# Patient Record
Sex: MALE | Race: BLACK OR AFRICAN AMERICAN | URBAN - METROPOLITAN AREA
[De-identification: names, ages, dates, MRNs, and addresses within clinical notes are randomized per-mention and may not be internally consistent; named-entity substitution may affect disease eponyms.]

---

## 2019-06-11 ENCOUNTER — MEDICAL CORRESPONDENCE (OUTPATIENT)
Dept: HEALTH INFORMATION MANAGEMENT | Facility: CLINIC | Age: 29
End: 2019-06-11

## 2019-08-20 ENCOUNTER — PRE VISIT (OUTPATIENT)
Dept: PLASTIC SURGERY | Facility: CLINIC | Age: 29
End: 2019-08-20

## 2019-08-20 NOTE — TELEPHONE ENCOUNTER
FUTURE VISIT INFORMATION      FUTURE VISIT INFORMATION:    Date: 9/24/19    Time: 4:00pm    Location: [facility] [place]  REFERRAL INFORMATION:    Referring provider:  Self    Referring providers clinic:  N/A    Reason for visit/diagnosis  Chest Masculinization Surgery    RECORDS REQUESTED FROM:       No recs to collect

## 2019-09-24 ENCOUNTER — PATIENT OUTREACH (OUTPATIENT)
Dept: PLASTIC SURGERY | Facility: CLINIC | Age: 29
End: 2019-09-24

## 2019-09-24 ENCOUNTER — OFFICE VISIT (OUTPATIENT)
Dept: PLASTIC SURGERY | Facility: CLINIC | Age: 29
End: 2019-09-24
Payer: COMMERCIAL

## 2019-09-24 VITALS
HEART RATE: 71 BPM | HEIGHT: 67 IN | SYSTOLIC BLOOD PRESSURE: 134 MMHG | DIASTOLIC BLOOD PRESSURE: 78 MMHG | WEIGHT: 165.6 LBS | OXYGEN SATURATION: 100 % | BODY MASS INDEX: 25.99 KG/M2

## 2019-09-24 DIAGNOSIS — Z12.31 VISIT FOR SCREENING MAMMOGRAM: ICD-10-CM

## 2019-09-24 DIAGNOSIS — F64.0 GENDER DYSPHORIA IN ADULT: Primary | ICD-10-CM

## 2019-09-24 RX ORDER — NEEDLES, DISPOSABLE 25GX5/8"
NEEDLE, DISPOSABLE MISCELLANEOUS
Refills: 3 | COMMUNITY
Start: 2019-09-05

## 2019-09-24 RX ORDER — SYRINGE, DISPOSABLE, 1 ML
SYRINGE, EMPTY DISPOSABLE MISCELLANEOUS
Refills: 2 | COMMUNITY
Start: 2019-03-13

## 2019-09-24 RX ORDER — SYRINGE AND NEEDLE,INSULIN,1ML 25GX1"
SYRINGE, EMPTY DISPOSABLE MISCELLANEOUS
Refills: 3 | COMMUNITY
Start: 2019-09-06

## 2019-09-24 RX ORDER — TESTOSTERONE CYPIONATE 200 MG/ML
INJECTION, SOLUTION INTRAMUSCULAR
Refills: 0 | COMMUNITY
Start: 2019-09-06

## 2019-09-24 ASSESSMENT — MIFFLIN-ST. JEOR: SCORE: 1679.79

## 2019-09-24 ASSESSMENT — PAIN SCALES - GENERAL: PAINLEVEL: NO PAIN (0)

## 2019-09-24 NOTE — Clinical Note
"2019       RE: Tess Kraus  [address] [facility] [address]     Dear Colleague,    Thank you for referring your patient, eTss Kraus, to the [facility] at [facility]. Please see a copy of my visit note below.    CC: Gender dysphoria, requesting top surgery.    HPI: Patient is a 29-year-old trans-man who prefers he him pronouns.  He has been interested in undergoing top surgery for the past 5 years.  He has history of binding his chest for 1 month after transitioning, but stopped this due to discomfort.  By undergoing top surgery, he would like to better align his physical body with his chosen gender identity.  He transitioned 1 year ago.  Chosen name is Bryan.  He has been on testosterone injection hormone therapy for the past year.  Denies any previous breast history.    MEDS:   Prior to Admission medications    Medication Sig Start Date End Date Taking? Authorizing Provider   B-D INSULIN SYRINGE 25G X 1\" 1 ML MISC USE TO DRAW UP MEDICATION 19  Yes Reported, Patient   B-D LUER-JUSTIN SYRINGE 20G X 1\" 1 ML MISC U TO DRAW UP MEDICATION UTD 3/13/19  Yes Reported, Patient   BD DISP NEEDLES 25G X 5/8\" MISC U UTD 19  Yes Reported, Patient   testosterone cypionate (DEPOTESTOSTERONE) 200 MG/ML injection INJECT 0.4 ML SC Q  WEEK 19  Yes Reported, Patient       ALLERGIES: None.    PMH: None.    PSH: None.    SH:   Social History     Tobacco Use     Smoking status: Former Smoker     Packs/day: 0.00     Last attempt to quit: 2012     Years since quittin.7     Smokeless tobacco: Never Used   Substance Use Topics     Alcohol use: Not on file   Works at [employer].    FH: None.    ROS: Denies chest pain, shortness of breath, diabetes, MI, CVA, DVT, PE, and bleeding disorders.    PHYSICAL EXAMINATION: /78 (BP Location: Left arm, Patient Position: Chair, Cuff Size: Adult Regular)   Pulse 71   Ht 1.702 m (5' 7\")   Wt 75.1 kg " "(165 lb 9.6 oz)   SpO2 100%   BMI 25.94 kg/m     ***    ASSESSMENT: ***    PLAN: ***    Total time spent with patient was *** min of which greater than 50% was in counseling.    CC: Gender dysphoria, requesting top surgery.    HPI: Patient is a 29-year-old trans-man who prefers he him pronouns.  He has been interested in undergoing top surgery for the past 5 years.  He has history of binding his chest for 1 month after transitioning, but stopped this due to discomfort.  By undergoing top surgery, he would like to better align his physical body with his chosen gender identity.  He transitioned 1 year ago.  Chosen name is Bryan.  He has been on testosterone injection hormone therapy for the past year.  Denies any previous breast history.    MEDS:   Prior to Admission medications    Medication Sig Start Date End Date Taking? Authorizing Provider   B-D INSULIN SYRINGE 25G X 1\" 1 ML MISC USE TO DRAW UP MEDICATION 19  Yes Reported, Patient   B-D LUER-JUSTIN SYRINGE 20G X 1\" 1 ML MISC U TO DRAW UP MEDICATION UTD 3/13/19  Yes Reported, Patient   BD DISP NEEDLES 25G X 5/8\" MISC U UTD 19  Yes Reported, Patient   testosterone cypionate (DEPOTESTOSTERONE) 200 MG/ML injection INJECT 0.4 ML SC Q  WEEK 19  Yes Reported, Patient       ALLERGIES: None.    PMH: None.    PSH: None.    SH:   Social History     Tobacco Use     Smoking status: Former Smoker     Packs/day: 0.00     Last attempt to quit: 2012     Years since quittin.7     Smokeless tobacco: Never Used   Substance Use Topics     Alcohol use: Not on file   Works at [employer].    FH: None.    ROS: Denies chest pain, shortness of breath, diabetes, MI, CVA, DVT, PE, and bleeding disorders.    PHYSICAL EXAMINATION: /78 (BP Location: Left arm, Patient Position: Chair, Cuff Size: Adult Regular)   Pulse 71   Ht 1.702 m (5' 7\")   Wt 75.1 kg (165 lb 9.6 oz)   SpO2 100%   BMI 25.94 kg/m     General: In no acute distress.  Appears masculine.  Chest examination " was performed in the presence of a chaperone.  This revealed bilateral grade 2 ptosis.  Pectoralis muscles intact bilaterally.  Patient is muscular.  Notch to nipple distance is 22 cm bilaterally.  Areole or diameter is 4.5 cm on the right and 5 cm on the left.  Nipple to fold distance is 9 cm bilaterally.  Base diameter is 13 7 m bilaterally.    ASSESSMENT: Gender dysphoria, requesting top surgery.    PLAN: Patient has provided us with a letter of support.  I am requesting a baseline screening mammogram and the level of ptosis.  With these obtained, patient is a potential candidate for bilateral simple complete mastectomy with free nipple graft reconstruction as a form of top surgery to masculinize the chest.  We did briefly discuss the possibility of reduction mammoplasty with an inferior pedicle to keep nipple sensation, but the patient would rather have a flat chest and attempt at nipple sensation.  I explained this outpatient procedure in detail today.  I explained the risks to include bleeding, infection, injury to surrounding structures, fluid collection, nipple or nipple graft loss, nipple sensory loss, change in nipple size, wound healing difficulties, contour deformity, dog ears, asymmetry, and need for revision surgery.  Patient accepts these associated risks and wished to proceed with surgery.  We will wait for the mammogram.  Given that the patient is working at [employer] and has lifting duties, I recommend the patient stay away from work for 6 weeks postoperatively.    Total time spent with patient was 30 min of which greater than 50% was in counseling.    Again, thank you for allowing me to participate in the care of your patient.      Sincerely,    Cal Callaway MD

## 2019-09-24 NOTE — NURSING NOTE
"Chief Complaint   Patient presents with     Consult     new pt here for top surg cosnult       Vitals:    09/24/19 1635   BP: 134/78   BP Location: Left arm   Patient Position: Chair   Cuff Size: Adult Regular   Pulse: 71   SpO2: 100%   Weight: 75.1 kg (165 lb 9.6 oz)   Height: 1.702 m (5' 7\")       Body mass index is 25.94 kg/m .    Lang Johnson, EMT    "

## 2019-09-24 NOTE — PROGRESS NOTES
"CC: Gender dysphoria, requesting top surgery.    HPI: Patient is a 29-year-old trans-man who prefers he him pronouns.  He has been interested in undergoing top surgery for the past 5 years.  He has history of binding his chest for 1 month after transitioning, but stopped this due to discomfort.  By undergoing top surgery, he would like to better align his physical body with his chosen gender identity.  He transitioned 1 year ago.  Chosen name is Bryan.  He has been on testosterone injection hormone therapy for the past year.  Denies any previous breast history.    MEDS:   Prior to Admission medications    Medication Sig Start Date End Date Taking? Authorizing Provider   B-D INSULIN SYRINGE 25G X 1\" 1 ML MISC USE TO DRAW UP MEDICATION 19  Yes Reported, Patient   B-D LUER-JUSTIN SYRINGE 20G X 1\" 1 ML MISC U TO DRAW UP MEDICATION UTD 3/13/19  Yes Reported, Patient   BD DISP NEEDLES 25G X 5/8\" MISC U UTD 19  Yes Reported, Patient   testosterone cypionate (DEPOTESTOSTERONE) 200 MG/ML injection INJECT 0.4 ML SC Q  WEEK 19  Yes Reported, Patient       ALLERGIES: None.    PMH: None.    PSH: None.    SH:   Social History     Tobacco Use     Smoking status: Former Smoker     Packs/day: 0.00     Last attempt to quit: 2012     Years since quittin.7     Smokeless tobacco: Never Used   Substance Use Topics     Alcohol use: Not on file   Works at [employer].    FH: None.    ROS: Denies chest pain, shortness of breath, diabetes, MI, CVA, DVT, PE, and bleeding disorders.    PHYSICAL EXAMINATION: /78 (BP Location: Left arm, Patient Position: Chair, Cuff Size: Adult Regular)   Pulse 71   Ht 1.702 m (5' 7\")   Wt 75.1 kg (165 lb 9.6 oz)   SpO2 100%   BMI 25.94 kg/m    General: In no acute distress.  Appears masculine.  Chest examination was performed in the presence of a chaperone.  This revealed bilateral grade 2 ptosis.  Pectoralis muscles intact bilaterally.  Patient is muscular.  Notch to nipple distance is 22 " cm bilaterally.  Areole or diameter is 4.5 cm on the right and 5 cm on the left.  Nipple to fold distance is 9 cm bilaterally.  Base diameter is 13 7 m bilaterally.    ASSESSMENT: Gender dysphoria, requesting top surgery.    PLAN: Patient has provided us with a letter of support.  I am requesting a baseline screening mammogram and the level of ptosis.  With these obtained, patient is a potential candidate for bilateral simple complete mastectomy with free nipple graft reconstruction as a form of top surgery to masculinize the chest.  We did briefly discuss the possibility of reduction mammoplasty with an inferior pedicle to keep nipple sensation, but the patient would rather have a flat chest and attempt at nipple sensation.  I explained this outpatient procedure in detail today.  I explained the risks to include bleeding, infection, injury to surrounding structures, fluid collection, nipple or nipple graft loss, nipple sensory loss, change in nipple size, wound healing difficulties, contour deformity, dog ears, asymmetry, and need for revision surgery.  Patient accepts these associated risks and wished to proceed with surgery.  We will wait for the mammogram.  Given that the patient is working at [employer] and has lifting duties, I recommend the patient stay away from work for 6 weeks postoperatively.    Total time spent with patient was 30 min of which greater than 50% was in counseling.

## 2019-09-24 NOTE — LETTER
Date:October 2, 2019      Patient was self referred, no letter generated. Do not send.        [facility] Health Information

## 2019-09-24 NOTE — PROGRESS NOTES
[facility]:  Care Coordination Note     SITUATION   Patient (Bryan, He/Him) is a 29 year old who is receiving support for:  Consult For (Top Surgery - Dr. Callaway) and Clinic Care Coordination - Face To Face  .    BACKGROUND     Pt attended consultation for top surgery with Dr. Callaway, pt was unaccompanied. Pt provided letter of support which was adequate for PA. Pt will likely use FMLA benefits for surgery. Pts girlfriend will support pt through surgery process and aftercare.     Pt recently moved from [place], [place] to [place], [place]. Pt lives with girlfriend, works at [employer] in [place], [place].     ASSESSMENT     Surgery              CGC Assessment  Comprehensive Gender Care ([facility]) Enrollment: Enrolled  Patient has a therapist: Yes  Name of therapist: Sharron Maravilla at [facility] in [place]  Letter of support #1: Received  Letter #1 Date: 06/11/19  Surgery being considered: Yes  Mastectomy: Yes          PLAN          Nursing Interventions:   [facility] program and services discussed with patient. Educational surgical packet provided and reviewed with patient. Process for accessing surgery discussed, including: WPATH standards of care, letters of support, treatment plan action steps, PA insurance process, surgery scheduling, and approximate timeline.     Pt has letter of support in chart, which is adequate for PA. KARLOS and photography consent signed by patient. Surgeon s photography outcomes reviewed with patient. Pt questions answered within scope of practice.     Pt was given list of PCP referrals.      Follow-up plan:  Pt needs to obtain mammogram per Dr. Callaway's note on 9/24/19. Once completed ready to PA.        Geo Heller

## 2019-09-27 ENCOUNTER — ANCILLARY PROCEDURE (OUTPATIENT)
Dept: MAMMOGRAPHY | Facility: CLINIC | Age: 29
End: 2019-09-27
Attending: PLASTIC SURGERY
Payer: COMMERCIAL

## 2019-09-27 DIAGNOSIS — Z12.31 VISIT FOR SCREENING MAMMOGRAM: ICD-10-CM

## 2019-10-03 ENCOUNTER — PATIENT OUTREACH (OUTPATIENT)
Dept: PLASTIC SURGERY | Facility: CLINIC | Age: 29
End: 2019-10-03

## 2019-10-03 NOTE — PROGRESS NOTES
[facility]:  Care Coordination Note     SITUATION   Bryan Kraus is a 29 year old adult who is receiving support for:  Gender Dysphoria.    BACKGROUND     Pt attended consultation for top surgery with Dr. Callaway.    ASSESSMENT     Surgery              CGC Assessment  Comprehensive Gender Care (CGC) Enrollment: Enrolled  Patient has a therapist: Yes  Name of therapist: Sharron Maravilla at [facility] in [place]  Letter of support #1: Received  Letter #1 Date: 06/11/19  Surgery being considered: Yes  Mastectomy: Yes  Mammogram completed: Yes(9/27/19 Negative)    9/27/19 12:27 PM [id number] [facility]    Assessment Category     Negative [1]   PACS Images      Show images for MA Screening Digital Bilateral   Study Result     Examination: Bilateral digital screening mammography with computer  aided detection.     Comparison: None.     History/Family history: No symptoms. Preoperative screening. No family  history.     BREAST DENSITY: Heterogeneously dense.     COMMENTS: No suspicious finding.                                                                      IMPRESSION: BI-RADS CATEGORY: 1 -  Negative.     RECOMMENDED FOLLOW-UP: Clinical Follow-up.     Results to be sent to the patient.     I have personally reviewed the examination and initial interpretation  and I agree with the findings.     BRICE ELI MD           PLAN       Nursing Interventions: LOS adequate. Mammogram results negative.     Follow-up plan:  Ready to PA.       Palmira Luis RN

## 2019-10-07 ENCOUNTER — TELEPHONE (OUTPATIENT)
Dept: PLASTIC SURGERY | Facility: CLINIC | Age: 29
End: 2019-10-07

## 2019-10-10 ENCOUNTER — TELEPHONE (OUTPATIENT)
Dept: PLASTIC SURGERY | Facility: CLINIC | Age: 29
End: 2019-10-10

## 2019-10-10 DIAGNOSIS — F64.0 GENDER DYSPHORIA IN ADULT: Primary | ICD-10-CM

## 2019-10-10 RX ORDER — CEFAZOLIN SODIUM 2 G/50ML
2 SOLUTION INTRAVENOUS
Status: CANCELLED | OUTPATIENT
Start: 2019-10-10

## 2019-10-10 RX ORDER — CEFAZOLIN SODIUM 1 G/50ML
1 INJECTION, SOLUTION INTRAVENOUS SEE ADMIN INSTRUCTIONS
Status: CANCELLED | OUTPATIENT
Start: 2019-10-10

## 2019-10-10 NOTE — TELEPHONE ENCOUNTER
received [facility] fax, approving PA for bilateral mastectomy with dr Callaway, ref#[id number] for date span 10/7/19-4/6/20

## 2019-10-21 ENCOUNTER — TELEPHONE (OUTPATIENT)
Dept: PLASTIC SURGERY | Facility: CLINIC | Age: 29
End: 2019-10-21

## 2019-10-21 PROBLEM — F64.0 GENDER DYSPHORIA IN ADULT: Status: ACTIVE | Noted: 2019-10-21

## 2019-10-21 NOTE — TELEPHONE ENCOUNTER
Patient is scheduled for surgery with Dr. Avi Callaway      Spoke with: Bryan    Date of Surgery: 10/24/2019    Location: [address] [address]  3rd floor- 3C    Informed patient they will need an adult  YES    Pre-op with surgeon (if applicable): not required    H&P: Scheduled with NP clinic at [facility] on 10/22/2019    Additional imaging/appointments: Not required    Surgery packet: MyChart     Additional comments: Post op scheduled on 11/01/2019 with nurse.

## 2019-10-22 ENCOUNTER — OFFICE VISIT (OUTPATIENT)
Dept: FAMILY MEDICINE | Facility: CLINIC | Age: 29
End: 2019-10-22
Payer: COMMERCIAL

## 2019-10-22 VITALS
SYSTOLIC BLOOD PRESSURE: 146 MMHG | HEIGHT: 67 IN | BODY MASS INDEX: 26.57 KG/M2 | HEART RATE: 57 BPM | OXYGEN SATURATION: 100 % | DIASTOLIC BLOOD PRESSURE: 79 MMHG | WEIGHT: 169.3 LBS

## 2019-10-22 DIAGNOSIS — Z01.818 PRE-OPERATIVE GENERAL PHYSICAL EXAMINATION: Primary | ICD-10-CM

## 2019-10-22 ASSESSMENT — PAIN SCALES - GENERAL: PAINLEVEL: NO PAIN (0)

## 2019-10-22 ASSESSMENT — MIFFLIN-ST. JEOR: SCORE: 1696.57

## 2019-10-22 NOTE — NURSING NOTE
"29 year old  Chief Complaint   Patient presents with     Pre-Op Exam       Blood pressure (!) 146/79, pulse 57, height 1.702 m (5' 7\"), weight 76.8 kg (169 lb 4.8 oz), SpO2 100 %. Body mass index is 26.52 kg/m .  BP completed using cuff size:    Patient Active Problem List   Diagnosis     Gender dysphoria in adult       Wt Readings from Last 2 Encounters:   10/22/19 76.8 kg (169 lb 4.8 oz)   19 75.1 kg (165 lb 9.6 oz)     BP Readings from Last 3 Encounters:   10/22/19 (!) 146/79   19 134/78       No Known Allergies    Current Outpatient Medications   Medication     B-D INSULIN SYRINGE 25G X 1\" 1 ML MISC     B-D LUER-JUSTIN SYRINGE 20G X 1\" 1 ML MISC     BD DISP NEEDLES 25G X 5/8\" MISC     testosterone cypionate (DEPOTESTOSTERONE) 200 MG/ML injection     No current facility-administered medications for this visit.        Social History     Tobacco Use     Smoking status: Former Smoker     Packs/day: 0.00     Last attempt to quit:      Years since quittin.8     Smokeless tobacco: Never Used   Substance Use Topics     Alcohol use: None     Drug use: None       Honoring Choices - Health Care Directive Guide offered to patient at time of visit.    Health Maintenance Due   Topic Date Due     PREVENTIVE CARE VISIT  1991     HIV SCREENING  2006     PAP  2012     DTAP/TDAP/TD IMMUNIZATION (1 - Tdap) 2016     INFLUENZA VACCINE (1) 2019         There is no immunization history on file for this patient.    No results found for: PAP      No lab results found.    PHQ-2 (  [employer]) 10/22/2019   Q1: Little interest or pleasure in doing things 0   Q2: Feeling down, depressed or hopeless 0   PHQ-2 Score 0       No flowsheet data found.    No flowsheet data found.    No flowsheet data found.      Karol Ochoa, [facility]  2019 3:52 PM  "

## 2019-10-22 NOTE — PROGRESS NOTES
"[facility]  [address]  [address]TH FLOOR  [facility] [address]  [phone]  Dept: [phone]    PRE-OP EVALUATION:  Today's date: 10/22/2019    Tess Kraus (: 1990) presents for pre-operative evaluation assessment as requested by ***.  He requires evaluation and anesthesia risk assessment prior to undergoing surgery/procedure for treatment of *** .  Proposed procedure: ***    Date of Surgery/ Procedure: ***  Time of Surgery/ Procedure: ***  Hospital/Surgical Facility: ***  {SURGERY FAX NUMBER:[id number]::\"Fax number for surgical facility: ***\"}  Primary Physician: No primary care provider on file.  Type of Anesthesia Anticipated: {ANESTHESIA:763029}    Patient has a Health Care Directive or Living Will:  {YES/NO:170207::\"NO\"}    {PREOP QUESTIONNAIRE OPTIONS 2 (by MA):971450}    HPI:                                                      Brief HPI related to upcoming procedure: ***      {Ch. Problems:745044}    MEDICAL HISTORY:                                                      Patient Active Problem List    Diagnosis Date Noted     Gender dysphoria in adult 10/21/2019     Priority: Medium     Added automatically from request for surgery [id number]        No past medical history on file.  No past surgical history on file.  Current Outpatient Medications   Medication Sig Dispense Refill     B-D INSULIN SYRINGE 25G X 1\" 1 ML MISC USE TO DRAW UP MEDICATION  3     B-D LUER-JUSTIN SYRINGE 20G X 1\" 1 ML MISC U TO DRAW UP MEDICATION UTD  2     BD DISP NEEDLES 25G X 5/8\" MISC U UTD  3     testosterone cypionate (DEPOTESTOSTERONE) 200 MG/ML injection INJECT 0.4 ML SC Q  WEEK  0     OTC products: {OTC ANALGESICS:520093}    No Known Allergies   Latex Allergy: {YES/NO WITH DEFAULT:854416::\"NO\"}    Social History     Tobacco Use     Smoking status: Former Smoker     Packs/day: 0.00     Last attempt to quit:      Years since quittin.8     Smokeless tobacco: Never Used   Substance Use " "Topics     Alcohol use: Not on file     History   Drug Use Not on file       REVIEW OF SYSTEMS:                                                    {ROS Preop Choices:384588}    EXAM:                                                    There were no vitals taken for this visit.  {EXAM Preop Choices:051822}    DIAGNOSTICS:                                                    {DIAGNOSTIC FOR PREOP:778827}    No results for input(s): HGB, PLT, INR, NA, POTASSIUM, CR, A1C in the last 33259 hours.     IMPRESSION:                                                    {PREOP REASONS:710009::\"Reason for surgery/procedure: ***\",\"Diagnosis/reason for consult: ***\"}    The proposed surgical procedure is considered {HIGH=major cardiovascular or procedures requiring prolonged anesthesia >4 hours or large fluid shifts;    INTERMEDIATE=abdominal, most orthopedic and intrathoracic surgery; LOW= endoscopy, cataract and breast surgery:794329} risk.    REVISED CARDIAC RISK INDEX  The patient has the following serious cardiovascular risks for perioperative complications such as (MI, PE, VFib and 3  AV Block):  {PREOP REVISED CARDIAC INDEX (RCI):214606:p:\"No serious cardiac risks\"}  INTERPRETATION: {REVISED CARDIAC RISK INTERPRETATION:247283}    The patient has the following additional risks for perioperative complications:  {Additional perioperative risks:552291:p:\"No identified additional risks\"}    {Additional contraindications to elective surgery:384169::\"APPROVAL GIVEN to proceed with proposed procedure, without further diagnostic evaluation\"}    No diagnosis found.    RECOMMENDATIONS:                                                    {IMPORTANT - complete carefully!!:486844}     Signed Electronically by: Estefani Cade NP    Copy of this evaluation report is provided to requesting physician.    [facility] Preop Guidelines    "

## 2019-10-22 NOTE — PROGRESS NOTES
[facility]  [address]  [address] [facility] [address]  [phone]  Dept: [phone]    PRE-OP EVALUATION:  Today's date: 10/22/2019    Tess Kraus (: 1990) presents for pre-operative evaluation assessment as requested by Dr. Avi Callaway.  He requires evaluation and anesthesia risk assessment prior to undergoing surgery/procedure for treatment of desired surgery related to gender dysphoria.  Proposed procedure: Bilateral Mastectomy with free nipple grafting    Date of Surgery/ Procedure: 10/24/2019  Time of Surgery/ Procedure: 3:05 pm  Hospital/Surgical Facility: [facility] [address]  Primary Physician: No primary care provider on file.  Type of Anesthesia Anticipated: Combine general with block    Patient has a Health Care Directive or Living Will: Unknown      Preop Questions 10/22/2019   Who is doing your surgery? Dr. Cal Callaway   What are you having done? Double Incision   Date of Surgery/Procedure: 10/24/19   Facility or Hospital where procedure/surgery will be performed: [facility]   1.  Do you have a history of Heart attack, stroke, stent, coronary bypass surgery, or other heart surgery? No   2.  Do you ever have any pain or discomfort in your chest? No   3.  Do you have a history of  Heart Failure? No   4.   Are you troubled by shortness of breath when:  walking on a level surface, or up a slight hill, or at night? No   5.  Do you currently have a cold, bronchitis or other respiratory infection? No   6.  Do you have a cough, shortness of breath, or wheezing? No   7.  Do you sometimes get pains in the calves of your legs when you walk? No   8. Do you or anyone in your family have previous history of blood clots? No   9.  Do you or does anyone in your family have a serious bleeding problem such as prolonged bleeding following surgeries or cuts? No   10. Have you ever had problems with anemia or been told to take iron pills? No   11.  "Have you had any abnormal blood loss such as black, tarry or bloody stools? No   12. Have you ever had a blood transfusion? No   13. Have you or any of your relatives ever had problems with anesthesia? No   14. Do you have sleep apnea, excessive snoring or daytime drowsiness? No   15. Do you have any prosthetic heart valves? No   16. Do you have prosthetic joints? No         HPI:                                                      Brief HPI related to upcoming procedure: Pt will be undergoing a top surgery for mastectomy on 10/24.  He has been using testosterone and transitioning with the support of his girlfriend.        MEDICAL HISTORY:                                                      Patient Active Problem List    Diagnosis Date Noted     Gender dysphoria in adult 10/21/2019     Priority: Medium     Added automatically from request for surgery [id number]        No past medical history on file.  No past surgical history on file.  Current Outpatient Medications   Medication Sig Dispense Refill     B-D INSULIN SYRINGE 25G X 1\" 1 ML MISC USE TO DRAW UP MEDICATION  3     B-D LUER-JUSTIN SYRINGE 20G X 1\" 1 ML MISC U TO DRAW UP MEDICATION UTD  2     BD DISP NEEDLES 25G X 5/8\" MISC U UTD  3     testosterone cypionate (DEPOTESTOSTERONE) 200 MG/ML injection INJECT 0.4 ML SC Q  WEEK  0     OTC products: Ibuprofen taken two days ago.    No Known Allergies   Latex Allergy: NO    Social History     Tobacco Use     Smoking status: Former Smoker     Packs/day: 0.00     Last attempt to quit: 2012     Years since quittin.8     Smokeless tobacco: Never Used   Substance Use Topics     Alcohol use: Not on file     History   Drug Use Not on file       REVIEW OF SYSTEMS:                                                    Constitutional, neuro, ENT, endocrine, pulmonary, cardiac, gastrointestinal, genitourinary, musculoskeletal, integument and psychiatric systems are negative, except as otherwise noted.    EXAM:                       " "                             BP (!) 146/79   Pulse 57   Ht 1.702 m (5' 7\")   Wt 76.8 kg (169 lb 4.8 oz)   SpO2 100%   BMI 26.52 kg/m        GENERAL APPEARANCE: healthy, alert and no distress     EYES: EOMI,  PERRL     HENT: ear canals and TM's normal and nose and mouth without ulcers or lesions     NECK: Right submaxillary enlargement. No adenopathy, no asymmetry, masses, or scars and thyroid normal to palpation     RESP: lungs clear to auscultation - no rales, rhonchi or wheezes     CV: regular rates and rhythm, normal S1 S2, no S3 or S4 and no murmur, click or rub     ABDOMEN:  soft, nontender, no HSM or masses and bowel sounds normal     MS: extremities normal- no gross deformities noted, no evidence of inflammation in joints, FROM in all extremities.     SKIN: no suspicious lesions or rashes     NEURO: Normal strength and tone, sensory exam grossly normal, mentation intact and speech normal     PSYCH: mentation appears normal. and affect normal/bright           DIAGNOSTICS:                                                    No labs or EKG required for low risk surgery (cataract, skin procedure, breast biopsy, etc)    No results for input(s): HGB, PLT, INR, NA, POTASSIUM, CR, A1C in the last 42820 hours.     IMPRESSION:                                                    Reason for surgery/procedure: Elective  Diagnosis/reason for consult: Pre-op evaluation prior to surgery.    The proposed surgical procedure is considered LOW risk.    REVISED CARDIAC RISK INDEX  The patient has the following serious cardiovascular risks for perioperative complications such as (MI, PE, VFib and 3  AV Block):  No serious cardiac risks  INTERPRETATION: 1 risks: Class II (low risk - 0.9% complication rate)    The patient has the following additional risks for perioperative complications:  No identified additional risks    APPROVAL GIVEN to proceed with proposed procedure, without further diagnostic evaluation    No diagnosis " found.    RECOMMENDATIONS:                                                    --Consult hospital rounder / IM to assist post-op medical management     Kingsley Modi DNP-FNP Student, [facility]    Signed Electronically by: Estefani Cade NP    I, Estefani Cade DNP, APRN, FNP, ANP, reviewed and verified the nurse practitioner (NP)  student's documentation of the patient's history.  I performed the exam and medical decision making activities with the NP student, who we present for learning purposes.      Copy of this evaluation report is provided to requesting physician.    [facility] Preop Guidelines

## 2019-10-23 ENCOUNTER — TRANSFERRED RECORDS (OUTPATIENT)
Dept: HEALTH INFORMATION MANAGEMENT | Facility: CLINIC | Age: 29
End: 2019-10-23

## 2019-10-23 ENCOUNTER — ANESTHESIA EVENT (OUTPATIENT)
Dept: SURGERY | Facility: CLINIC | Age: 29
End: 2019-10-23
Payer: COMMERCIAL

## 2019-10-24 ENCOUNTER — ANESTHESIA (OUTPATIENT)
Dept: SURGERY | Facility: CLINIC | Age: 29
End: 2019-10-24
Payer: COMMERCIAL

## 2019-10-24 ENCOUNTER — HOSPITAL ENCOUNTER (OUTPATIENT)
Facility: CLINIC | Age: 29
Discharge: HOME | End: 2019-10-24
Attending: PLASTIC SURGERY | Admitting: PLASTIC SURGERY
Payer: COMMERCIAL

## 2019-10-24 VITALS
BODY MASS INDEX: 26.06 KG/M2 | HEIGHT: 67 IN | RESPIRATION RATE: 16 BRPM | WEIGHT: 166.01 LBS | DIASTOLIC BLOOD PRESSURE: 60 MMHG | TEMPERATURE: 97.9 F | OXYGEN SATURATION: 98 % | HEART RATE: 73 BPM | SYSTOLIC BLOOD PRESSURE: 94 MMHG

## 2019-10-24 DIAGNOSIS — F64.0 GENDER DYSPHORIA IN ADULT: ICD-10-CM

## 2019-10-24 LAB
GLUCOSE BLDC GLUCOMTR-MCNC: 74 MG/DL (ref 70–99)
HCG UR QL: NEGATIVE
HGB BLD-MCNC: 16 G/DL (ref 13.3–17.7)

## 2019-10-24 PROCEDURE — 82962 GLUCOSE BLOOD TEST: CPT

## 2019-10-24 PROCEDURE — 25000128 H RX IP 250 OP 636: Performed by: STUDENT IN AN ORGANIZED HEALTH CARE EDUCATION/TRAINING PROGRAM

## 2019-10-24 PROCEDURE — 25800030 ZZH RX IP 258 OP 636: Performed by: STUDENT IN AN ORGANIZED HEALTH CARE EDUCATION/TRAINING PROGRAM

## 2019-10-24 PROCEDURE — 40000171 ZZH STATISTIC PRE-PROCEDURE ASSESSMENT III: Performed by: PLASTIC SURGERY

## 2019-10-24 PROCEDURE — 71000027 ZZH RECOVERY PHASE 2 EACH 15 MINS: Performed by: PLASTIC SURGERY

## 2019-10-24 PROCEDURE — 25000128 H RX IP 250 OP 636: Performed by: NURSE ANESTHETIST, CERTIFIED REGISTERED

## 2019-10-24 PROCEDURE — 25000128 H RX IP 250 OP 636: Performed by: ANESTHESIOLOGY

## 2019-10-24 PROCEDURE — 36415 COLL VENOUS BLD VENIPUNCTURE: CPT | Performed by: ANESTHESIOLOGY

## 2019-10-24 PROCEDURE — 88305 TISSUE EXAM BY PATHOLOGIST: CPT | Performed by: PLASTIC SURGERY

## 2019-10-24 PROCEDURE — 85018 HEMOGLOBIN: CPT | Performed by: ANESTHESIOLOGY

## 2019-10-24 PROCEDURE — 25800030 ZZH RX IP 258 OP 636: Performed by: PLASTIC SURGERY

## 2019-10-24 PROCEDURE — 27210794 ZZH OR GENERAL SUPPLY STERILE: Performed by: PLASTIC SURGERY

## 2019-10-24 PROCEDURE — 25000125 ZZHC RX 250: Performed by: STUDENT IN AN ORGANIZED HEALTH CARE EDUCATION/TRAINING PROGRAM

## 2019-10-24 PROCEDURE — 71000014 ZZH RECOVERY PHASE 1 LEVEL 2 FIRST HR: Performed by: PLASTIC SURGERY

## 2019-10-24 PROCEDURE — 25000132 ZZH RX MED GY IP 250 OP 250 PS 637: Performed by: STUDENT IN AN ORGANIZED HEALTH CARE EDUCATION/TRAINING PROGRAM

## 2019-10-24 PROCEDURE — 25000125 ZZHC RX 250: Performed by: NURSE ANESTHETIST, CERTIFIED REGISTERED

## 2019-10-24 PROCEDURE — 25000566 ZZH SEVOFLURANE, EA 15 MIN: Performed by: PLASTIC SURGERY

## 2019-10-24 PROCEDURE — 25800030 ZZH RX IP 258 OP 636: Performed by: NURSE ANESTHETIST, CERTIFIED REGISTERED

## 2019-10-24 PROCEDURE — 25000128 H RX IP 250 OP 636: Performed by: PLASTIC SURGERY

## 2019-10-24 PROCEDURE — 37000008 ZZH ANESTHESIA TECHNICAL FEE, 1ST 30 MIN: Performed by: PLASTIC SURGERY

## 2019-10-24 PROCEDURE — 36000057 ZZH SURGERY LEVEL 3 1ST 30 MIN - UMMC: Performed by: PLASTIC SURGERY

## 2019-10-24 PROCEDURE — 37000009 ZZH ANESTHESIA TECHNICAL FEE, EACH ADDTL 15 MIN: Performed by: PLASTIC SURGERY

## 2019-10-24 PROCEDURE — 36000059 ZZH SURGERY LEVEL 3 EA 15 ADDTL MIN UMMC: Performed by: PLASTIC SURGERY

## 2019-10-24 PROCEDURE — 81025 URINE PREGNANCY TEST: CPT | Performed by: ANESTHESIOLOGY

## 2019-10-24 RX ORDER — SODIUM CHLORIDE, SODIUM LACTATE, POTASSIUM CHLORIDE, CALCIUM CHLORIDE 600; 310; 30; 20 MG/100ML; MG/100ML; MG/100ML; MG/100ML
INJECTION, SOLUTION INTRAVENOUS CONTINUOUS PRN
Status: DISCONTINUED | OUTPATIENT
Start: 2019-10-24 | End: 2019-10-24

## 2019-10-24 RX ORDER — HYDROMORPHONE HYDROCHLORIDE 1 MG/ML
.3-.5 INJECTION, SOLUTION INTRAMUSCULAR; INTRAVENOUS; SUBCUTANEOUS EVERY 10 MIN PRN
Status: DISCONTINUED | OUTPATIENT
Start: 2019-10-24 | End: 2019-10-24 | Stop reason: HOSPADM

## 2019-10-24 RX ORDER — AMOXICILLIN 250 MG
1-2 CAPSULE ORAL 2 TIMES DAILY PRN
Qty: 30 TABLET | Refills: 0 | Status: SHIPPED | OUTPATIENT
Start: 2019-10-24

## 2019-10-24 RX ORDER — FENTANYL CITRATE 50 UG/ML
25-50 INJECTION, SOLUTION INTRAMUSCULAR; INTRAVENOUS
Status: DISCONTINUED | OUTPATIENT
Start: 2019-10-24 | End: 2019-10-24 | Stop reason: HOSPADM

## 2019-10-24 RX ORDER — ONDANSETRON 4 MG/1
4 TABLET, ORALLY DISINTEGRATING ORAL
Status: DISCONTINUED | OUTPATIENT
Start: 2019-10-24 | End: 2019-10-24 | Stop reason: HOSPADM

## 2019-10-24 RX ORDER — LIDOCAINE HYDROCHLORIDE 20 MG/ML
INJECTION, SOLUTION INFILTRATION; PERINEURAL PRN
Status: DISCONTINUED | OUTPATIENT
Start: 2019-10-24 | End: 2019-10-24

## 2019-10-24 RX ORDER — OXYCODONE HYDROCHLORIDE 5 MG/1
5-10 TABLET ORAL EVERY 6 HOURS PRN
Qty: 26 TABLET | Refills: 0 | Status: SHIPPED | OUTPATIENT
Start: 2019-10-24

## 2019-10-24 RX ORDER — SODIUM CHLORIDE, SODIUM LACTATE, POTASSIUM CHLORIDE, CALCIUM CHLORIDE 600; 310; 30; 20 MG/100ML; MG/100ML; MG/100ML; MG/100ML
INJECTION, SOLUTION INTRAVENOUS CONTINUOUS
Status: DISCONTINUED | OUTPATIENT
Start: 2019-10-24 | End: 2019-10-24 | Stop reason: HOSPADM

## 2019-10-24 RX ORDER — GABAPENTIN 300 MG/1
300 CAPSULE ORAL ONCE
Status: COMPLETED | OUTPATIENT
Start: 2019-10-24 | End: 2019-10-24

## 2019-10-24 RX ORDER — EPHEDRINE SULFATE 50 MG/ML
INJECTION, SOLUTION INTRAMUSCULAR; INTRAVENOUS; SUBCUTANEOUS PRN
Status: DISCONTINUED | OUTPATIENT
Start: 2019-10-24 | End: 2019-10-24

## 2019-10-24 RX ORDER — ONDANSETRON 2 MG/ML
4 INJECTION INTRAMUSCULAR; INTRAVENOUS EVERY 30 MIN PRN
Status: DISCONTINUED | OUTPATIENT
Start: 2019-10-24 | End: 2019-10-24 | Stop reason: HOSPADM

## 2019-10-24 RX ORDER — HYDROXYZINE HYDROCHLORIDE 25 MG/1
25 TABLET, FILM COATED ORAL
Status: DISCONTINUED | OUTPATIENT
Start: 2019-10-24 | End: 2019-10-24 | Stop reason: HOSPADM

## 2019-10-24 RX ORDER — FENTANYL CITRATE 50 UG/ML
INJECTION, SOLUTION INTRAMUSCULAR; INTRAVENOUS PRN
Status: DISCONTINUED | OUTPATIENT
Start: 2019-10-24 | End: 2019-10-24

## 2019-10-24 RX ORDER — DEXAMETHASONE SODIUM PHOSPHATE 10 MG/ML
INJECTION, SOLUTION INTRAMUSCULAR; INTRAVENOUS PRN
Status: DISCONTINUED | OUTPATIENT
Start: 2019-10-24 | End: 2019-10-24

## 2019-10-24 RX ORDER — PROPOFOL 10 MG/ML
INJECTION, EMULSION INTRAVENOUS PRN
Status: DISCONTINUED | OUTPATIENT
Start: 2019-10-24 | End: 2019-10-24

## 2019-10-24 RX ORDER — DEXAMETHASONE SODIUM PHOSPHATE 4 MG/ML
INJECTION, SOLUTION INTRA-ARTICULAR; INTRALESIONAL; INTRAMUSCULAR; INTRAVENOUS; SOFT TISSUE PRN
Status: DISCONTINUED | OUTPATIENT
Start: 2019-10-24 | End: 2019-10-24

## 2019-10-24 RX ORDER — CEFAZOLIN SODIUM 2 G/100ML
INJECTION, SOLUTION INTRAVENOUS PRN
Status: DISCONTINUED | OUTPATIENT
Start: 2019-10-24 | End: 2019-10-24

## 2019-10-24 RX ORDER — NALOXONE HYDROCHLORIDE 0.4 MG/ML
.1-.4 INJECTION, SOLUTION INTRAMUSCULAR; INTRAVENOUS; SUBCUTANEOUS
Status: DISCONTINUED | OUTPATIENT
Start: 2019-10-24 | End: 2019-10-24 | Stop reason: HOSPADM

## 2019-10-24 RX ORDER — BUPIVACAINE HYDROCHLORIDE 2.5 MG/ML
INJECTION, SOLUTION EPIDURAL; INFILTRATION; INTRACAUDAL PRN
Status: DISCONTINUED | OUTPATIENT
Start: 2019-10-24 | End: 2019-10-24

## 2019-10-24 RX ORDER — FENTANYL CITRATE 50 UG/ML
25-50 INJECTION, SOLUTION INTRAMUSCULAR; INTRAVENOUS EVERY 5 MIN PRN
Status: DISCONTINUED | OUTPATIENT
Start: 2019-10-24 | End: 2019-10-24 | Stop reason: HOSPADM

## 2019-10-24 RX ORDER — GLYCOPYRROLATE 0.2 MG/ML
INJECTION, SOLUTION INTRAMUSCULAR; INTRAVENOUS PRN
Status: DISCONTINUED | OUTPATIENT
Start: 2019-10-24 | End: 2019-10-24

## 2019-10-24 RX ORDER — ACETAMINOPHEN 325 MG/1
650 TABLET ORAL
Status: DISCONTINUED | OUTPATIENT
Start: 2019-10-24 | End: 2019-10-24 | Stop reason: HOSPADM

## 2019-10-24 RX ORDER — MEPERIDINE HYDROCHLORIDE 25 MG/ML
12.5 INJECTION INTRAMUSCULAR; INTRAVENOUS; SUBCUTANEOUS
Status: DISCONTINUED | OUTPATIENT
Start: 2019-10-24 | End: 2019-10-24 | Stop reason: HOSPADM

## 2019-10-24 RX ORDER — ACETAMINOPHEN 325 MG/1
975 TABLET ORAL ONCE
Status: COMPLETED | OUTPATIENT
Start: 2019-10-24 | End: 2019-10-24

## 2019-10-24 RX ORDER — HYDROXYZINE PAMOATE 25 MG/1
25 CAPSULE ORAL EVERY 6 HOURS PRN
Qty: 30 CAPSULE | Refills: 0 | Status: SHIPPED | OUTPATIENT
Start: 2019-10-24

## 2019-10-24 RX ORDER — ACETAMINOPHEN 325 MG/1
975 TABLET ORAL ONCE
Status: DISCONTINUED | OUTPATIENT
Start: 2019-10-24 | End: 2019-10-24 | Stop reason: HOSPADM

## 2019-10-24 RX ORDER — ONDANSETRON 4 MG/1
4 TABLET, ORALLY DISINTEGRATING ORAL EVERY 30 MIN PRN
Status: DISCONTINUED | OUTPATIENT
Start: 2019-10-24 | End: 2019-10-24 | Stop reason: HOSPADM

## 2019-10-24 RX ORDER — ONDANSETRON 2 MG/ML
INJECTION INTRAMUSCULAR; INTRAVENOUS PRN
Status: DISCONTINUED | OUTPATIENT
Start: 2019-10-24 | End: 2019-10-24

## 2019-10-24 RX ORDER — FLUMAZENIL 0.1 MG/ML
0.2 INJECTION, SOLUTION INTRAVENOUS
Status: DISCONTINUED | OUTPATIENT
Start: 2019-10-24 | End: 2019-10-24 | Stop reason: HOSPADM

## 2019-10-24 RX ORDER — CEFAZOLIN SODIUM 2 G/100ML
2 INJECTION, SOLUTION INTRAVENOUS
Status: COMPLETED | OUTPATIENT
Start: 2019-10-24 | End: 2019-10-24

## 2019-10-24 RX ORDER — CEFAZOLIN SODIUM 1 G/3ML
1 INJECTION, POWDER, FOR SOLUTION INTRAMUSCULAR; INTRAVENOUS SEE ADMIN INSTRUCTIONS
Status: DISCONTINUED | OUTPATIENT
Start: 2019-10-24 | End: 2019-10-24 | Stop reason: HOSPADM

## 2019-10-24 RX ORDER — OXYCODONE HYDROCHLORIDE 5 MG/1
5 TABLET ORAL
Status: DISCONTINUED | OUTPATIENT
Start: 2019-10-24 | End: 2019-10-24 | Stop reason: HOSPADM

## 2019-10-24 RX ORDER — LIDOCAINE 40 MG/G
CREAM TOPICAL
Status: DISCONTINUED | OUTPATIENT
Start: 2019-10-24 | End: 2019-10-24 | Stop reason: HOSPADM

## 2019-10-24 RX ADMIN — BUPIVACAINE HYDROCHLORIDE 60 ML: 2.5 INJECTION, SOLUTION EPIDURAL; INFILTRATION; INTRACAUDAL; PERINEURAL at 14:00

## 2019-10-24 RX ADMIN — PHENYLEPHRINE HYDROCHLORIDE 50 MCG: 10 INJECTION INTRAVENOUS at 17:05

## 2019-10-24 RX ADMIN — PROCHLORPERAZINE EDISYLATE 10 MG: 5 INJECTION INTRAMUSCULAR; INTRAVENOUS at 19:49

## 2019-10-24 RX ADMIN — Medication 5 MG: at 15:31

## 2019-10-24 RX ADMIN — GLYCOPYRROLATE 0.2 MG: 0.2 INJECTION, SOLUTION INTRAMUSCULAR; INTRAVENOUS at 16:15

## 2019-10-24 RX ADMIN — LIDOCAINE HYDROCHLORIDE 100 MG: 20 INJECTION, SOLUTION INFILTRATION; PERINEURAL at 15:11

## 2019-10-24 RX ADMIN — DEXAMETHASONE SODIUM PHOSPHATE 6 MG: 4 INJECTION, SOLUTION INTRA-ARTICULAR; INTRALESIONAL; INTRAMUSCULAR; INTRAVENOUS; SOFT TISSUE at 15:34

## 2019-10-24 RX ADMIN — PHENYLEPHRINE HYDROCHLORIDE 50 MCG: 10 INJECTION INTRAVENOUS at 17:25

## 2019-10-24 RX ADMIN — ACETAMINOPHEN 975 MG: 325 TABLET, FILM COATED ORAL at 13:31

## 2019-10-24 RX ADMIN — SODIUM CHLORIDE, POTASSIUM CHLORIDE, SODIUM LACTATE AND CALCIUM CHLORIDE: 600; 310; 30; 20 INJECTION, SOLUTION INTRAVENOUS at 17:03

## 2019-10-24 RX ADMIN — CEFAZOLIN SODIUM 2 G: 2 INJECTION, SOLUTION INTRAVENOUS at 15:23

## 2019-10-24 RX ADMIN — ONDANSETRON 4 MG: 2 INJECTION INTRAMUSCULAR; INTRAVENOUS at 17:46

## 2019-10-24 RX ADMIN — GABAPENTIN 300 MG: 300 CAPSULE ORAL at 13:31

## 2019-10-24 RX ADMIN — FENTANYL CITRATE 50 MCG: 50 INJECTION, SOLUTION INTRAMUSCULAR; INTRAVENOUS at 16:49

## 2019-10-24 RX ADMIN — CEFAZOLIN SODIUM 1 G: 2 INJECTION, SOLUTION INTRAVENOUS at 17:23

## 2019-10-24 RX ADMIN — ONDANSETRON 4 MG: 2 INJECTION INTRAMUSCULAR; INTRAVENOUS at 15:35

## 2019-10-24 RX ADMIN — ROCURONIUM BROMIDE 50 MG: 10 INJECTION INTRAVENOUS at 15:12

## 2019-10-24 RX ADMIN — FENTANYL CITRATE 50 MCG: 50 INJECTION INTRAMUSCULAR; INTRAVENOUS at 13:56

## 2019-10-24 RX ADMIN — ROCURONIUM BROMIDE 20 MG: 10 INJECTION INTRAVENOUS at 16:12

## 2019-10-24 RX ADMIN — PROPOFOL 200 MG: 10 INJECTION, EMULSION INTRAVENOUS at 15:11

## 2019-10-24 RX ADMIN — PHENYLEPHRINE HYDROCHLORIDE 100 MCG: 10 INJECTION INTRAVENOUS at 16:12

## 2019-10-24 RX ADMIN — MIDAZOLAM 2 MG: 1 INJECTION INTRAMUSCULAR; INTRAVENOUS at 14:59

## 2019-10-24 RX ADMIN — MIDAZOLAM 1 MG: 1 INJECTION INTRAMUSCULAR; INTRAVENOUS at 13:56

## 2019-10-24 RX ADMIN — FENTANYL CITRATE 50 MCG: 50 INJECTION, SOLUTION INTRAMUSCULAR; INTRAVENOUS at 14:59

## 2019-10-24 RX ADMIN — HYDROMORPHONE HYDROCHLORIDE 0.5 MG: 1 INJECTION, SOLUTION INTRAMUSCULAR; INTRAVENOUS; SUBCUTANEOUS at 16:50

## 2019-10-24 RX ADMIN — SUGAMMADEX 200 MG: 100 INJECTION, SOLUTION INTRAVENOUS at 17:55

## 2019-10-24 RX ADMIN — DEXMEDETOMIDINE HYDROCHLORIDE 40 MCG: 100 INJECTION, SOLUTION INTRAVENOUS at 14:00

## 2019-10-24 RX ADMIN — DEXAMETHASONE SODIUM PHOSPHATE 2 MG: 10 INJECTION, SOLUTION INTRAMUSCULAR; INTRAVENOUS at 14:00

## 2019-10-24 RX ADMIN — SODIUM CHLORIDE, POTASSIUM CHLORIDE, SODIUM LACTATE AND CALCIUM CHLORIDE: 600; 310; 30; 20 INJECTION, SOLUTION INTRAVENOUS at 14:59

## 2019-10-24 ASSESSMENT — MIFFLIN-ST. JEOR: SCORE: 1681.63

## 2019-10-24 NOTE — ANESTHESIA PREPROCEDURE EVALUATION
Anesthesia Pre-Procedure Evaluation    Patient: Tess Kraus   MRN:     [id number] Gender:   adult   Age:    29 year old :      1990        Preoperative Diagnosis: Gender dysphoria in adult [F64.0]   Procedure(s):  Bilateral mastectomy with free nipple grafting     History reviewed. No pertinent past medical history.   History reviewed. No pertinent surgical history.       Anesthesia Evaluation     . Pt has not had prior anesthetic            ROS/MED HX    ENT/Pulmonary:  - neg pulmonary ROS     Neurologic:  - neg neurologic ROS     Cardiovascular:  - neg cardiovascular ROS       METS/Exercise Tolerance:  >4 METS   Hematologic:  - neg hematologic  ROS       Musculoskeletal:  - neg musculoskeletal ROS       GI/Hepatic:  - neg GI/hepatic ROS       Renal/Genitourinary:  - ROS Renal section negative       Endo:  - neg endo ROS       Psychiatric:  - neg psychiatric ROS       Infectious Disease:  - neg infectious disease ROS       Malignancy:      - no malignancy   Other:                         PHYSICAL EXAM:   Mental Status/Neuro:    Airway: Facies: Feasible  Mallampati: I  Mouth/Opening: Full  TM distance: > 6 cm  Neck ROM: Full   Respiratory: Auscultation: CTAB     Resp. Rate: Normal     Resp. Effort: Normal      CV: Rhythm: Regular  Rate: Age appropriate  Heart: Normal Sounds  Edema: None   Comments:                      LABS:  CBC:   Lab Results   Component Value Date    HGB 16.0 10/24/2019     BMP: No results found for: NA, POTASSIUM, CHLORIDE, CO2, BUN, CR, GLC  COAGS: No results found for: PTT, INR, FIBR  POC:   Lab Results   Component Value Date    BGM 74 10/24/2019    HCG Negative 10/24/2019     OTHER: No results found for: PH, LACT, A1C, RAMÍREZ, PHOS, MAG, ALBUMIN, PROTTOTAL, ALT, AST, GGT, ALKPHOS, BILITOTAL, BILIDIRECT, LIPASE, AMYLASE, ROZ, TSH, T4, T3, CRP, SED     Preop Vitals    BP Readings from Last 3 Encounters:   10/24/19 135/86   10/22/19 (!) 146/79   19 134/78    Pulse Readings from  "Last 3 Encounters:   10/24/19 68   10/22/19 57   09/24/19 71      Resp Readings from Last 3 Encounters:   10/24/19 22    SpO2 Readings from Last 3 Encounters:   10/24/19 92%   10/22/19 100%   09/24/19 100%      Temp Readings from Last 1 Encounters:   10/24/19 36.8  C (98.2  F) (Oral)    Ht Readings from Last 1 Encounters:   10/24/19 1.702 m (5' 7\")      Wt Readings from Last 1 Encounters:   10/24/19 75.3 kg (166 lb 0.1 oz)    Estimated body mass index is 26 kg/m  as calculated from the following:    Height as of this encounter: 1.702 m (5' 7\").    Weight as of this encounter: 75.3 kg (166 lb 0.1 oz).     LDA:        Assessment:   ASA SCORE: 1    H&P: History and physical reviewed and following examination; no interval change.   Smoking Status:  Non-Smoker/Unknown   NPO Status: NPO Appropriate     Plan:   Anes. Type:  General   Pre-Medication: Midazolam   Induction:  IV (Standard)   Airway: ETT; Oral   Access/Monitoring: PIV   Maintenance: Balanced     Postop Plan:   Postop Pain: Opioids  Postop Sedation/Airway: Not planned  Disposition: Outpatient     PONV Management:   Adult Risk Factors:, Non-Smoker, Postop Opioids   Prevention: Ondansetron, Dexamethasone     CONSENT: Direct conversation   Plan and risks discussed with: Patient   Blood Products: Consented (ALL Blood Products)                   Angie Alfaro MD  "

## 2019-10-24 NOTE — ANESTHESIA POSTPROCEDURE EVALUATION
Anesthesia POST Procedure Evaluation    Patient: Tess Kraus   MRN:     [id number] Gender:   adult   Age:    29 year old :      1990        Preoperative Diagnosis: Gender dysphoria in adult [F64.0]   Procedure(s):  Bilateral mastectomy with free nipple grafting   Postop Comments: No value filed.       Anesthesia Type:  Not documented  General    Reportable Event: NO     PAIN: Uncomplicated   Sign Out status: Comfortable, Well controlled pain     PONV: No PONV   Sign Out status:  No Nausea or Vomiting     Neuro/Psych: Uneventful perioperative course   Sign Out Status: Preoperative baseline; Age appropriate mentation     Airway/Resp.: Uneventful perioperative course   Sign Out Status: Non labored breathing, age appropriate RR; Resp. Status within EXPECTED Parameters     CV: Uneventful perioperative course   Sign Out status: Appropriate BP and perfusion indices; Appropriate HR/Rhythm     Disposition:   Sign Out in:  PACU  Disposition:  Floor  Recovery Course: Uneventful  Follow-Up: Not required           Last Anesthesia Record Vitals:  CRNA VITALS  10/24/2019 1737 - 10/24/2019 1837      10/24/2019             Resp Rate (observed):  (!) 5          Last PACU Vitals:  Vitals Value Taken Time   /77 10/24/2019  6:50 PM   Temp 36.8  C (98.2  F) 10/24/2019  6:45 PM   Pulse 63 10/24/2019  6:50 PM   Resp 16 10/24/2019  6:45 PM   SpO2 100 % 10/24/2019  6:59 PM   Temp src     NIBP     Pulse     SpO2     Resp     Temp     Ht Rate     Temp 2     Vitals shown include unvalidated device data.      Electronically Signed By: Noe Santiago MD, 2019, 6:59 PM

## 2019-10-24 NOTE — ANESTHESIA PROCEDURE NOTES
Peripheral Nerve Block Procedure Note  Date/Time: 10/24/2019 2:00 PM    Staff:     Anesthesiologist:  Bebo Merlos MD    Resident/CRNA:  Jeannie He MD    Block performed by resident/CRNA in the presence of a teaching physician    Location: Pre-op  Procedure Start/Stop TImes:      10/24/2019 2:00 PM    patient identified, IV checked, site marked, risks and benefits discussed, informed consent, monitors and equipment checked, pre-op evaluation, at physician/surgeon's request and post-op pain management      Correct Patient: Yes      Correct Position: Yes      Correct Site: Yes      Correct Procedure: Yes      Correct Laterality:  Yes    Site Marked:  Yes  Procedure details:     Procedure:  Pectoralis (Bilateral PECS)    ASA:  1    Diagnosis:  Post op pain control    Laterality:  Bilateral    Position:  Sitting    Sterile Prep: chloraprep, mask and sterile gloves      Local skin infiltration:  None    Needle:  Short bevel    Needle gauge:  21    Needle length (mm):  110    Ultrasound: Yes      Ultrasound used to identify targeted nerve, plexus, or vascular structure and placed a needle adjacent to it      Permanent Image entered into patiient's record      Abnormal pain on injection: No      Blood Aspirated: No      Bleeding at site: No      Bolus via:  Needle    Infusion Method:  Single Shot    Complications:  None

## 2019-10-24 NOTE — OP NOTE
DATE OF OPERATION: October 24, 2019    PREOPERATIVE DIAGNOSIS: Gender dysphoria.    POSTOPERATIVE DIAGNOSIS: Gender dysphoria.    PROCEDURES PERFORMED:   1.  Bilateral simple complete mastectomy.  2.  Bilateral nipple reconstruction with free nipple grafts, size 2 x 2 cm each.    SURGEON: Cal Callaway MD    ASSISTANT: Milady Carney MD    ANESTHESIA: General with bilateral pectoralis blocks.    ESTIMATED BLOOD LOSS: 50 mL.    SPECIMENS: Bilateral breast tissue. Right breast tissue weight 304 g. Left breast tissue weight 308 g.    COMPLICATIONS: None.    DRAINS: Two 15 Wolof drains, one in each chest.    IMPLANTS: None.    INDICATIONS: Patient is a 29-year-old trans-man who prefers he him pronouns. He received a letter of support for top surgery. He has history of binding his chest, but stopped the practice due to chest pain. He transitioned 1 year ago. He has been on testosterone injection hormone therapy for 1 year. September 2019 baseline screening mammogram showed BI-RADS CATEGORY 1. Risks benefits and alternatives were discussed for bilateral simple complete mastectomy with free nipple graft reconstruction as a form of top surgery. Patient accepted the associated risks and wished to proceed with surgery.    DESCRIPTION OF PROCEDURE: Informed consent was reviewed with patient and there were no further questions. The chest was then marked along the midline, bilateral mammary lines, inframammary folds, breast footprint, and the proposed superior breast incision. Patient then underwent bilateral pectoralis blocks with Anesthesia. Patient was then taken to the operating room and placed in a supine position. Preoperative antibiotics were given, bilateral lower leg SCD's were placed, and general anesthesia was obtained. All pressure points were padded and arms placed on arm boards. The chest was then prepped and draped in a sterile fashion. A time out was performed.    We started on the right side. 90 cc of 1:1,000,000  epinephrine solution was injected into the superior subcutaneous tissue for hemostasis and also to delineate the breast capsule. A 2 x 2 cm nipple graft was excised sharply and stored in a saline moist gauze. The proposed incisions were made with a scalpel and carried down into the subcutaneous tissue with bovie cautery. From the inframammary fold incision, the breast tissue was lifted off the chest wall up to the preoperatively marked breast footprint using bovie cautery with care taken to leave the pectoralis fascia intact. Then through the superior incision the interval between the breast capsule and subcutaneous tissue was sharply dissected using facelift scissors. Dissection was carried medially to the sternum, superiorly to the clavicle, laterally to the axilla and lateral border of pectoralis, and inferiorly to the inframammary fold. The breast tissue was then removed in total to include its axillary tail of Smith and weighed. The inferior and lateral chest subcutaneous tissue and dermis was then tacked down in an interrupted fashion to the underlying rib periosteum using 0 PDS suture to create a masculine flat appearance to the side of the chest. The incision was then temporarily closed with staples. The exact same procedure was then performed on the left side.    Patient was then transitioned to a sitting position. Contour was found to be flat and symmetric. New nipple locations were chosen just medial to the lateral border of the pectoralis muscles and oval shaped 2 x 1.5 cm nipples were marked. Symmetry of these were confirmed with notch to nipple and midline to nipple measurements. Patient was then placed back into a supine position. The new nipple markings were de epithelialized sharply. This created a 2 x 2 cm wound bed due to tension. The temporary staples were removed. 15 Uzbek drains were placed, one in each chest pocket, entering through the hair bearing region of the axillae. These were sewn in.  Hemostasis was achieved and wounds were irrigated. Hemostatic powder was applied to the wound beds. Incisions were closed in layers using Monocryl suture. Nipple grafts were aggressively thinned with a pair of scissors. These were then placed over the de epithelialized areas with 5-0 fast absorbing gut running suture. Xeroform and mineral oil soaked cotton ball bolsters were placed over these with 3-0 chromic sutures. All counts were correct at the end of the case. A compression dressing was applied to the chest. Patient was then extubated, awakened, and transferred to the postoperative area in stable condition.    DISPOSITION: Home.

## 2019-10-24 NOTE — ANESTHESIA CARE TRANSFER NOTE
Patient: Tess Kraus    Procedure(s):  Bilateral mastectomy with free nipple grafting    Diagnosis: Gender dysphoria in adult [F64.0]  Diagnosis Additional Information: No value filed.    Anesthesia Type:   General     Note:  Airway :Nasal Cannula  Patient transferred to:PACU  Comments: Anesthesia Care Transfer Note    Patient: Tess Kraus    Transferred to: PACU    Patient vital signs: stable    Airway: none    Monitors on, VSS, pt. Stable, Report given to PACU TALAT Avila CRNA  10/24/2019 6:12 PM      Handoff Report: Identifed the Patient, Identified the Reponsible Provider, Reviewed the pertinent medical history, Discussed the surgical course, Reviewed Intra-OP anesthesia mangement and issues during anesthesia, Set expectations for post-procedure period and Allowed opportunity for questions and acknowledgement of understanding      Vitals: (Last set prior to Anesthesia Care Transfer)    CRNA VITALS  10/24/2019 1737 - 10/24/2019 1812      10/24/2019             Resp Rate (observed):  (!) 5                Electronically Signed By: LINDA Hooper CRNA  October 24, 2019  6:12 PM

## 2019-10-24 NOTE — OR NURSING
Dayne Pec block performed without complications.  VSS.  Pt tolerated well.  50mcg Fent and 1mg Versed given. Will continue to monitor.

## 2019-10-25 NOTE — DISCHARGE INSTRUCTIONS
[facility]  Same-Day Surgery   Adult Discharge Orders & Instructions     For 24 hours after surgery    1. Get plenty of rest.  A responsible adult must stay with you for at least 24 hours after you leave the hospital.   2. Do not drive or use heavy equipment.  If you have weakness or tingling, don't drive or use heavy equipment until this feeling goes away.  3. Do not drink alcohol.  4. Avoid strenuous or risky activities.  Ask for help when climbing stairs.   5. You may feel lightheaded.  IF so, sit for a few minutes before standing.  Have someone help you get up.   6. If you have nausea (feel sick to your stomach): Drink only clear liquids such as apple juice, ginger ale, broth or 7-Up.  Rest may also help.  Be sure to drink enough fluids.  Move to a regular diet as you feel able.  7. You may have a slight fever. Call the doctor if your fever is over 100 F (37.7 C) (taken under the tongue) or lasts longer than 24 hours.  8. You may have a dry mouth, a sore throat, muscle aches or trouble sleeping.  These should go away after 24 hours.  9. Do not make important or legal decisions.   Call your doctor for any of the followin.  Signs of infection (fever, growing tenderness at the surgery site, a large amount of drainage or bleeding, severe pain, foul-smelling drainage, redness, swelling).    2. It has been over 8 to 10 hours since surgery and you are still not able to urinate (pass water).    3.  Headache for over 24 hours.      To contact a doctor, call Dr Callaway at [phone] or:        [phone] and ask for the resident on call for Plastic Surgery (answered 24 hours a day)      Emergency Department:    [facility]: [phone]       (TTY for hearing impaired: [phone])               Tips for taking pain medications  To get the best pain relief possible , remember these points:      Take pain medications as directed, before pain becomes severe      Pain  medication can upset your stomach: taking it with food may help      Constipation is a common side effect of pain medication. Drink plenty of  Fluids      Eat foods high in fiber. Take a stool softener  if recommended by your doctor or  Pharmacist.        Do not drink alcohol, drive or operate machinery while taking pain medications.      Ask about other ways to control pain, such as with heat, ice or relaxation.

## 2019-10-29 ENCOUNTER — DOCUMENTATION ONLY (OUTPATIENT)
Dept: PLASTIC SURGERY | Facility: CLINIC | Age: 29
End: 2019-10-29

## 2019-10-29 NOTE — PROGRESS NOTES
This patient's FMLA paperwork was faxed to [employer] at fax number [phone].   Incident#: [id number]  Patient's FMLA is from 10/24/19 through 12/06/19.   Surgeon: Dr. Callaway  A copy was also scanned into the patient's chart.     12:53 PM, 10/29/2019  Lang Johnson, EMT

## 2019-11-01 ENCOUNTER — ALLIED HEALTH/NURSE VISIT (OUTPATIENT)
Dept: SURGERY | Facility: CLINIC | Age: 29
End: 2019-11-01
Payer: COMMERCIAL

## 2019-11-01 DIAGNOSIS — F64.0 GENDER DYSPHORIA IN ADULT: Primary | ICD-10-CM

## 2019-11-01 NOTE — PATIENT INSTRUCTIONS
Care of Nipples and Chest Incisions    Change nipple dressings daily.  Take dressings off prior to showering and reapply after showering.  No direct shower spray on nipple grafts for 4 weeks.     Cut vaseline gauze to nipple size and place over nipple.  Place folded white gauze over vaseline gauze and cover with plastic dressing.  Do dressing changes for 2 more weeks.  If you continue to have drainage, continue the dressings until drainage stops.     Keep compression on for 2 more weeks. No lifting greater than 5 lbs for 4 weeks. Begin moisturizing your incisions with any non-scented, non-glittered lotion. You may peel off incisional tape at 3 weeks after your surgery date. Then apply silicone gel sheets to incisions.  These can be purchased on [employer] and at [employer].     At one month post op, baseline shoulder motion should return. You may start to exercise lightly at this time, gradually moving up to arm movement. Full shoulder motion should return by 8 weeks.      Schedule a post op appt with Dr. Callaway in 8 weeks out at the pod in front of office.     When to call:  Sudden increase of swelling or pain on one side  Uncontrolled pain despite pain medication  Worsening of chest swelling   Separation of nipple from chest skin  Redness and warmth in chest area  Fever > 101     Contact the RN between 8-3:30 Mon-Fri with questions or concerns through my chart message via your doctor's name (most efficient) or call at [phone]. Your call or message will be returned same day.     For urgent medical issues that cannot wait, call [phone] Mon-Fri 8:-4:30.     After hours, weekends or holidays, call [phone] and ask to speak to the on [facility] plastic surgeon fellow.

## 2019-11-01 NOTE — NURSING NOTE
Pt. comes into clinic today at the request of Dr. Cal Callaway.    This service provided today was under the supervising provider of the day Dr. Callaway, who was available if needed.    Reason for visit: Post op visit.  Pt returns one week after bilateral mastectomy with free nipple grafts.    Nipple grafts:  Nipple bolsters removed-good adherence to skin    Incisions:  Well approximated, no drainage, no redness  Pain:  Denies- using Tylenol occasionally  Drains: Bilateral DAVON drains < 20 ml for several days.  Both removed.  Instructions:  See AVS  Return to clinic:  See Dr. Callaway in 8 weeks    Chung Oneill LPN  Under the supervision of Vilma Green RN.  Care Coordinator

## 2019-11-03 LAB — COPATH REPORT: NORMAL

## 2019-11-21 ENCOUNTER — DOCUMENTATION ONLY (OUTPATIENT)
Dept: PLASTIC SURGERY | Facility: CLINIC | Age: 29
End: 2019-11-21

## 2019-11-21 NOTE — PROGRESS NOTES
This patient's FMLA paperwork was faxed to [employer] at fax number [phone].   Incident # [id number]  Patient's FMLA is from 10/24/19 through 12/5/19. -- this is an extension from the 4 weeks [employer] originally granted (10/24 through 11/27)    Surgeon: Dr. Callaway  A copy was also scanned into the patient's chart.     10:25 AM, 11/21/2019  Lang Johnson, EMT

## 2020-03-11 ENCOUNTER — HEALTH MAINTENANCE LETTER (OUTPATIENT)
Age: 30
End: 2020-03-11

## 2021-01-03 ENCOUNTER — HEALTH MAINTENANCE LETTER (OUTPATIENT)
Age: 31
End: 2021-01-03

## 2021-04-25 ENCOUNTER — HEALTH MAINTENANCE LETTER (OUTPATIENT)
Age: 31
End: 2021-04-25

## 2021-10-10 ENCOUNTER — HEALTH MAINTENANCE LETTER (OUTPATIENT)
Age: 31
End: 2021-10-10

## 2022-05-21 ENCOUNTER — HEALTH MAINTENANCE LETTER (OUTPATIENT)
Age: 32
End: 2022-05-21

## 2022-09-18 ENCOUNTER — HEALTH MAINTENANCE LETTER (OUTPATIENT)
Age: 32
End: 2022-09-18

## 2023-06-04 ENCOUNTER — HEALTH MAINTENANCE LETTER (OUTPATIENT)
Age: 33
End: 2023-06-04

## (undated) DEVICE — CLOSURE SYS SKIN PREMIERPRO EXOFINFUSION 4X60CM 3473

## (undated) DEVICE — ADH LIQUID MASTISOL TOPICAL VIAL 2-3ML 0523-48

## (undated) DEVICE — PEN MARKING W/RULER DYNJSM04

## (undated) DEVICE — GLOVE PROTEXIS POWDER FREE 7.5 ORTHOPEDIC 2D73ET75

## (undated) DEVICE — DRSG COTTON BALL 6PK LCB62

## (undated) DEVICE — ESU PENCIL W/COATED BLADE E2450H

## (undated) DEVICE — STPL SKIN 35W ROTATING HEAD PRW35

## (undated) DEVICE — DRSG PRIMAPORE 02X3" 7133

## (undated) DEVICE — SU PDS II 0 CTX 36" Z370T

## (undated) DEVICE — DRAIN JACKSON PRATT CHANNEL 15FR ROUND HUBLESS SIL JP-2228

## (undated) DEVICE — DRAPE IOBAN INCISE 23X17" 6650EZ

## (undated) DEVICE — SU PLAIN FAST ABSORB 5-0 PC-1 18" 1915G

## (undated) DEVICE — DRAIN JACKSON PRATT RESERVOIR 100ML SU130-1305

## (undated) DEVICE — SYR 30ML LL W/O NDL 302832

## (undated) DEVICE — PREP CHLORAPREP 26ML TINTED ORANGE  260815

## (undated) DEVICE — LINEN TOWEL PACK X6 WHITE 5487

## (undated) DEVICE — DRAPE U SPLIT 74X120" 29440

## (undated) DEVICE — PAD CHUX UNDERPAD 30X36" P3036C

## (undated) DEVICE — ESU GROUND PAD ADULT W/CORD E7507

## (undated) DEVICE — DRSG KERLIX 4 1/2"X4YDS ROLL 6715

## (undated) DEVICE — BLADE KNIFE SURG 15 371115

## (undated) DEVICE — SU STRATAFIX MONOCRYL 3-0 SPIRAL PS-2 45CM SXMP1B107

## (undated) DEVICE — SUCTION TIP YANKAUER STR K87

## (undated) DEVICE — SU ETHILON 3-0 PS-1 18" 1663H

## (undated) DEVICE — Device

## (undated) DEVICE — LINEN TOWEL PACK X30 5481

## (undated) DEVICE — BNDG ELASTIC 6"X5YDS STERILE 6611-6S

## (undated) DEVICE — STPL SKIN PROXIMATE 35 WIDE PMW35

## (undated) DEVICE — DRSG TEGADERM 4X4 3/4" 1626W

## (undated) DEVICE — ESU ELEC BLADE 2.75" COATED/INSULATED E1455

## (undated) DEVICE — DRSG XEROFORM 5X9" CUR253590W

## (undated) DEVICE — SU MONOCRYL 2-0 SH 27" UND Y417H

## (undated) DEVICE — SOL WATER IRRIG 1000ML BOTTLE 2F7114

## (undated) DEVICE — HEMOSTAT ABSORBABLE AGENT ARISTA 3GM POWDER SM0002-USA

## (undated) DEVICE — CUP AND LID 2PK 2OZ STERILE  SSK9006A

## (undated) DEVICE — SOL NACL 0.9% IRRIG 1000ML BOTTLE 2F7124

## (undated) DEVICE — SU CHROMIC 3-0 SH 27" G122H

## (undated) DEVICE — NDL SPINAL 20GA 3.5" 405182

## (undated) DEVICE — GLOVE ESTEEM BLUE W/NEU-THERA 7.5  2D73PB75

## (undated) DEVICE — NDL BLUNT 18GA 1" W/O FILTER 305181

## (undated) RX ORDER — FENTANYL CITRATE 50 UG/ML
INJECTION, SOLUTION INTRAMUSCULAR; INTRAVENOUS
Status: DISPENSED
Start: 2019-10-24

## (undated) RX ORDER — HYDROMORPHONE HYDROCHLORIDE 1 MG/ML
INJECTION, SOLUTION INTRAMUSCULAR; INTRAVENOUS; SUBCUTANEOUS
Status: DISPENSED
Start: 2019-10-24

## (undated) RX ORDER — MINERAL OIL
OIL (ML) MISCELLANEOUS
Status: DISPENSED
Start: 2019-10-24

## (undated) RX ORDER — ACETAMINOPHEN 325 MG/1
TABLET ORAL
Status: DISPENSED
Start: 2019-10-24

## (undated) RX ORDER — DEXAMETHASONE SODIUM PHOSPHATE 4 MG/ML
INJECTION, SOLUTION INTRA-ARTICULAR; INTRALESIONAL; INTRAMUSCULAR; INTRAVENOUS; SOFT TISSUE
Status: DISPENSED
Start: 2019-10-24

## (undated) RX ORDER — EPHEDRINE SULFATE 50 MG/ML
INJECTION, SOLUTION INTRAMUSCULAR; INTRAVENOUS; SUBCUTANEOUS
Status: DISPENSED
Start: 2019-10-24

## (undated) RX ORDER — CEFAZOLIN SODIUM 2 G/100ML
INJECTION, SOLUTION INTRAVENOUS
Status: DISPENSED
Start: 2019-10-24

## (undated) RX ORDER — GABAPENTIN 300 MG/1
CAPSULE ORAL
Status: DISPENSED
Start: 2019-10-24

## (undated) RX ORDER — GLYCOPYRROLATE 0.2 MG/ML
INJECTION, SOLUTION INTRAMUSCULAR; INTRAVENOUS
Status: DISPENSED
Start: 2019-10-24

## (undated) RX ORDER — PHENYLEPHRINE HCL IN 0.9% NACL 1 MG/10 ML
SYRINGE (ML) INTRAVENOUS
Status: DISPENSED
Start: 2019-10-24

## (undated) RX ORDER — EPINEPHRINE 1 MG/ML
INJECTION, SOLUTION INTRAMUSCULAR; SUBCUTANEOUS
Status: DISPENSED
Start: 2019-10-24

## (undated) RX ORDER — ONDANSETRON 2 MG/ML
INJECTION INTRAMUSCULAR; INTRAVENOUS
Status: DISPENSED
Start: 2019-10-24

## (undated) RX ORDER — LIDOCAINE HYDROCHLORIDE 20 MG/ML
INJECTION, SOLUTION EPIDURAL; INFILTRATION; INTRACAUDAL; PERINEURAL
Status: DISPENSED
Start: 2019-10-24